# Patient Record
Sex: FEMALE
[De-identification: names, ages, dates, MRNs, and addresses within clinical notes are randomized per-mention and may not be internally consistent; named-entity substitution may affect disease eponyms.]

---

## 2023-01-04 ENCOUNTER — APPOINTMENT (OUTPATIENT)
Dept: HEART AND VASCULAR | Facility: CLINIC | Age: 23
End: 2023-01-04
Payer: MEDICAID

## 2023-01-04 DIAGNOSIS — Z01.818 ENCOUNTER FOR OTHER PREPROCEDURAL EXAMINATION: ICD-10-CM

## 2023-01-04 PROBLEM — Z00.00 ENCOUNTER FOR PREVENTIVE HEALTH EXAMINATION: Status: ACTIVE | Noted: 2023-01-04

## 2023-01-04 PROCEDURE — 99204 OFFICE O/P NEW MOD 45 MIN: CPT | Mod: 25

## 2023-01-04 PROCEDURE — 93000 ELECTROCARDIOGRAM COMPLETE: CPT | Mod: NC

## 2023-01-05 VITALS — HEART RATE: 73 BPM | DIASTOLIC BLOOD PRESSURE: 64 MMHG | SYSTOLIC BLOOD PRESSURE: 110 MMHG

## 2023-01-05 PROBLEM — Z01.818 PREOP EXAMINATION: Status: ACTIVE | Noted: 2023-01-05

## 2023-01-16 NOTE — DISCUSSION/SUMMARY
[FreeTextEntry1] : 22F obese no sig pmhx who presents for pre-operative evaluation prior to gastric sleeve surgery\par \par Pre-Operative evaluation: RSRI 0. Normal vital signs, normal ECG. Pt is low risk for this procedure. She is optimized from a cardiac perspective for this procedure.\par \par Hyperlipidemia: Elevated LDL. Pt advised on diet changes. Repeat Lipid Panel in 6 months\par \par Vitamin D Deficiency: pt advised to take daily vitamin D supplementation. This is likely the cause of her elevated PTH as well. She will need to f/u with PMD for repeat labs in the future after she starts taking VItamin D.\par \par RTC after surgery

## 2023-01-16 NOTE — HISTORY OF PRESENT ILLNESS
[FreeTextEntry1] : 22F obese who presents for pre-procedural evaluation prior to gastric sleeve surgery. She has not scheduled surgery yet. She denies chest pain, sob, diaphoresis, weakness, or loc. Takes no medications. No pmhx. No prior surgeries. Active daily without any issues. Denies being on birth control. Not sexually active at this time. \par